# Patient Record
Sex: MALE | Race: WHITE | NOT HISPANIC OR LATINO | Employment: FULL TIME | URBAN - NONMETROPOLITAN AREA
[De-identification: names, ages, dates, MRNs, and addresses within clinical notes are randomized per-mention and may not be internally consistent; named-entity substitution may affect disease eponyms.]

---

## 2019-12-06 ENCOUNTER — OFFICE VISIT (OUTPATIENT)
Dept: INTERNAL MEDICINE | Facility: CLINIC | Age: 54
End: 2019-12-06

## 2019-12-06 VITALS
HEART RATE: 95 BPM | WEIGHT: 198 LBS | DIASTOLIC BLOOD PRESSURE: 88 MMHG | SYSTOLIC BLOOD PRESSURE: 151 MMHG | OXYGEN SATURATION: 98 % | BODY MASS INDEX: 30.01 KG/M2 | HEIGHT: 68 IN | TEMPERATURE: 98.4 F | RESPIRATION RATE: 15 BRPM

## 2019-12-06 DIAGNOSIS — Z79.899 LONG TERM USE OF DRUG: ICD-10-CM

## 2019-12-06 DIAGNOSIS — E66.09 CLASS 1 OBESITY DUE TO EXCESS CALORIES WITH SERIOUS COMORBIDITY AND BODY MASS INDEX (BMI) OF 30.0 TO 30.9 IN ADULT: ICD-10-CM

## 2019-12-06 DIAGNOSIS — I10 ESSENTIAL HYPERTENSION: ICD-10-CM

## 2019-12-06 DIAGNOSIS — M79.2 PERIPHERAL NEUROGENIC PAIN: ICD-10-CM

## 2019-12-06 DIAGNOSIS — N52.9 ERECTILE DYSFUNCTION, UNSPECIFIED ERECTILE DYSFUNCTION TYPE: ICD-10-CM

## 2019-12-06 DIAGNOSIS — E78.2 MIXED HYPERLIPIDEMIA: ICD-10-CM

## 2019-12-06 DIAGNOSIS — E11.65 UNCONTROLLED TYPE 2 DIABETES MELLITUS WITH HYPERGLYCEMIA (HCC): Primary | ICD-10-CM

## 2019-12-06 DIAGNOSIS — E03.9 HYPOTHYROIDISM, UNSPECIFIED TYPE: ICD-10-CM

## 2019-12-06 DIAGNOSIS — G47.9 SLEEPING DIFFICULTIES: ICD-10-CM

## 2019-12-06 PROBLEM — E11.9 DIABETES MELLITUS (HCC): Status: ACTIVE | Noted: 2018-02-01

## 2019-12-06 PROBLEM — E78.5 HYPERLIPIDEMIA: Status: ACTIVE | Noted: 2018-02-01

## 2019-12-06 LAB — HBA1C MFR BLD: 8.8 %

## 2019-12-06 PROCEDURE — 83036 HEMOGLOBIN GLYCOSYLATED A1C: CPT | Performed by: NURSE PRACTITIONER

## 2019-12-06 PROCEDURE — 99202 OFFICE O/P NEW SF 15 MIN: CPT | Performed by: NURSE PRACTITIONER

## 2019-12-06 RX ORDER — ASPIRIN 81 MG/1
TABLET ORAL
COMMUNITY
Start: 2018-02-01

## 2019-12-06 RX ORDER — IBUPROFEN 800 MG/1
TABLET ORAL
COMMUNITY

## 2019-12-06 RX ORDER — LEVOTHYROXINE SODIUM 0.07 MG/1
75 TABLET ORAL DAILY
Refills: 0 | COMMUNITY
Start: 2019-11-18 | End: 2020-10-09 | Stop reason: SDUPTHER

## 2019-12-06 RX ORDER — ATORVASTATIN CALCIUM 80 MG/1
80 TABLET, FILM COATED ORAL
COMMUNITY
End: 2020-10-25

## 2019-12-06 RX ORDER — LISINOPRIL AND HYDROCHLOROTHIAZIDE 25; 20 MG/1; MG/1
TABLET ORAL
COMMUNITY
End: 2020-01-22

## 2019-12-06 RX ORDER — GABAPENTIN 300 MG/1
300 CAPSULE ORAL NIGHTLY PRN
Qty: 30 CAPSULE | Refills: 1 | Status: SHIPPED | OUTPATIENT
Start: 2019-12-06 | End: 2020-02-20

## 2019-12-06 RX ORDER — GLYBURIDE 5 MG/1
5 TABLET ORAL 2 TIMES DAILY
Refills: 0 | COMMUNITY
Start: 2019-11-18 | End: 2020-10-09 | Stop reason: SDUPTHER

## 2020-01-22 ENCOUNTER — OFFICE VISIT (OUTPATIENT)
Dept: INTERNAL MEDICINE | Facility: CLINIC | Age: 55
End: 2020-01-22

## 2020-01-22 VITALS
DIASTOLIC BLOOD PRESSURE: 86 MMHG | SYSTOLIC BLOOD PRESSURE: 154 MMHG | TEMPERATURE: 97.7 F | HEIGHT: 68 IN | OXYGEN SATURATION: 96 % | HEART RATE: 96 BPM | RESPIRATION RATE: 15 BRPM | WEIGHT: 201 LBS | BODY MASS INDEX: 30.46 KG/M2

## 2020-01-22 DIAGNOSIS — L98.9 SKIN LESION OF FACE: ICD-10-CM

## 2020-01-22 DIAGNOSIS — Z13.228 SCREENING FOR ENDOCRINE, NUTRITIONAL, METABOLIC AND IMMUNITY DISORDER: ICD-10-CM

## 2020-01-22 DIAGNOSIS — E66.09 CLASS 1 OBESITY DUE TO EXCESS CALORIES WITH SERIOUS COMORBIDITY AND BODY MASS INDEX (BMI) OF 30.0 TO 30.9 IN ADULT: ICD-10-CM

## 2020-01-22 DIAGNOSIS — E03.9 HYPOTHYROIDISM, UNSPECIFIED TYPE: ICD-10-CM

## 2020-01-22 DIAGNOSIS — E78.2 MIXED HYPERLIPIDEMIA: ICD-10-CM

## 2020-01-22 DIAGNOSIS — Z13.21 SCREENING FOR ENDOCRINE, NUTRITIONAL, METABOLIC AND IMMUNITY DISORDER: ICD-10-CM

## 2020-01-22 DIAGNOSIS — M72.0 DUPUYTREN'S DISEASE OF PALM OF BOTH HANDS: ICD-10-CM

## 2020-01-22 DIAGNOSIS — N52.9 ERECTILE DYSFUNCTION, UNSPECIFIED ERECTILE DYSFUNCTION TYPE: ICD-10-CM

## 2020-01-22 DIAGNOSIS — G62.9 NEUROPATHY: ICD-10-CM

## 2020-01-22 DIAGNOSIS — Z00.00 PHYSICAL EXAM, ANNUAL: Primary | ICD-10-CM

## 2020-01-22 DIAGNOSIS — Z13.0 SCREENING FOR ENDOCRINE, NUTRITIONAL, METABOLIC AND IMMUNITY DISORDER: ICD-10-CM

## 2020-01-22 DIAGNOSIS — E11.65 UNCONTROLLED TYPE 2 DIABETES MELLITUS WITH HYPERGLYCEMIA (HCC): ICD-10-CM

## 2020-01-22 DIAGNOSIS — I10 ESSENTIAL HYPERTENSION: ICD-10-CM

## 2020-01-22 DIAGNOSIS — G89.29 CHRONIC LEFT HIP PAIN: ICD-10-CM

## 2020-01-22 DIAGNOSIS — M25.552 CHRONIC LEFT HIP PAIN: ICD-10-CM

## 2020-01-22 DIAGNOSIS — Z13.29 SCREENING FOR ENDOCRINE, NUTRITIONAL, METABOLIC AND IMMUNITY DISORDER: ICD-10-CM

## 2020-01-22 LAB
POC CREATININE URINE: 200
POC MICROALBUMIN URINE: 10

## 2020-01-22 PROCEDURE — 82044 UR ALBUMIN SEMIQUANTITATIVE: CPT | Performed by: NURSE PRACTITIONER

## 2020-01-22 PROCEDURE — 99396 PREV VISIT EST AGE 40-64: CPT | Performed by: NURSE PRACTITIONER

## 2020-01-22 RX ORDER — IBUPROFEN 800 MG/1
TABLET ORAL
COMMUNITY
End: 2020-01-22

## 2020-01-22 RX ORDER — LISINOPRIL 40 MG/1
40 TABLET ORAL DAILY
Qty: 30 TABLET | Refills: 1 | Status: SHIPPED | OUTPATIENT
Start: 2020-01-22 | End: 2020-01-23

## 2020-01-22 NOTE — PROGRESS NOTES
Subjective   Troy Ruiz is a 54 y.o. male and is here for a comprehensive physical exam. The patient reports problems - left hip pain.  Patient denies any injury or fall but that he normally would sleep on left side but became difficult for him to walk with his walker or sleep on side.  Patient denies having problems breath or hypotension.  Vital signs are stable with exception of elevated blood pressure.  He has not been very active of blood pressure has remained around the same that he was on 40 mg of lisinopril in the past but stopped taking it.  he had no energy at all.  patient states that his blood sugar has been doing better but he did not like it today.  hemoglobin a1c was 8.8 12/6/19.      Do you take any herbs or supplements that were not prescribed by a doctor?no  Are you taking calcium supplements? No  Are you taking aspirin daily? No  Exercise:4 days week and tries to do more but has been having some hip pain  Vision UTD:Yes  Dental UTD:Yes   History:  Patient receives prostate care here: Yes  He reports No decrease in urinary stream,  No nocturia, No dribbling, No hesitancy.    STDs:No  Sexually active at age:17  Abuse:No  Checks testicles:Yes  He is  and has 3 children.  He denies any history of STDs or abuse.  Patient does chew tobacco 1/2 can daily.  He does report some alcohol usage.     The following portions of the patient's history were reviewed and updated as appropriate: allergies, current medications, past family history, past medical history, past social history, past surgical history and problem list.    Review of Systems  Do you have pain that bothers you in your daily life? yes  Review of Systems   Constitutional: Positive for fatigue and unexpected weight gain. Negative for chills, fever and unexpected weight loss.   HENT: Negative for congestion, ear pain, hearing loss, rhinorrhea, sinus pressure, sneezing, sore throat and trouble swallowing.    Eyes: Negative for discharge  "and itching.   Respiratory: Negative for cough, chest tightness and shortness of breath.    Cardiovascular: Negative for chest pain, palpitations and leg swelling.   Gastrointestinal: Negative for abdominal pain, blood in stool, constipation, diarrhea and vomiting.   Endocrine: Negative for polydipsia and polyuria.   Genitourinary: Positive for frequency and erectile dysfunction. Negative for difficulty urinating, dysuria, hematuria, urgency and urinary incontinence.   Musculoskeletal: Positive for arthralgias and myalgias. Negative for back pain, gait problem and joint swelling.        Left hip pain   Skin: Negative for rash and bruise.   Allergic/Immunologic: Negative for immunocompromised state.   Neurological: Positive for numbness. Negative for dizziness, syncope, weakness, light-headedness and headache.   Hematological: Does not bruise/bleed easily.   Psychiatric/Behavioral: Positive for sleep disturbance and stress. Negative for behavioral problems and dysphoric mood. The patient is not nervous/anxious.          Objective   /86   Pulse 96   Temp 97.7 °F (36.5 °C)   Resp 15   Ht 172.7 cm (68\")   Wt 91.2 kg (201 lb)   SpO2 96%   BMI 30.56 kg/m²     Office Visit on 01/22/2020   Component Date Value Ref Range Status   • Microalbumin, Urine 01/22/2020 10   Final   • Creatinine, Urine 01/22/2020 200   Final       Physical Exam   Constitutional: He is oriented to person, place, and time. He appears well-developed and well-nourished. No distress.   HENT:   Head: Normocephalic and atraumatic.   Right Ear: Tympanic membrane, external ear and ear canal normal.   Left Ear: Tympanic membrane, external ear and ear canal normal.   Nose: Nose normal. No sinus tenderness. Rhinorrhea: clear nasal secretions. Right sinus exhibits no maxillary sinus tenderness and no frontal sinus tenderness. Left sinus exhibits no maxillary sinus tenderness and no frontal sinus tenderness.   Mouth/Throat: Oropharynx is clear and " moist.   PND   Eyes: Pupils are equal, round, and reactive to light. Conjunctivae and EOM are normal.   Neck: Normal range of motion. Neck supple.   Cardiovascular: Normal rate, regular rhythm, normal heart sounds and intact distal pulses.   Pulmonary/Chest: Effort normal and breath sounds normal. No respiratory distress.   Abdominal: Soft. Bowel sounds are normal. He exhibits no distension. There is no tenderness. There is no guarding.   Genitourinary:   Genitourinary Comments: deferred   Musculoskeletal: He exhibits tenderness and deformity.        Right hand: He exhibits decreased range of motion, bony tenderness and deformity.        Left hand: He exhibits decreased range of motion, bony tenderness and deformity.        Hands:   Lumps noted in bilateral palms and firmness with ridges and decrease mobility       During the foot exam he had a monofilament test performed.  Lymphadenopathy:        Head (right side): No submental, no submandibular and no tonsillar adenopathy present.        Head (left side): No submental, no submandibular and no tonsillar adenopathy present.     He has no cervical adenopathy.   Neurological: He is alert and oriented to person, place, and time. Coordination normal.   Skin: Skin is warm and dry. Capillary refill takes less than 2 seconds. No rash noted.   Psychiatric: He has a normal mood and affect. His behavior is normal. Judgment and thought content normal.   Nursing note and vitals reviewed.        Assessment/Plan   Healthy male exam.      1.Troy was seen today for annual exam.    Diagnoses and all orders for this visit:    Physical exam, annual  -     Comprehensive metabolic panel  -     Lipid panel  -     CBC w AUTO Differential    Uncontrolled type 2 diabetes mellitus with hyperglycemia (CMS/[facility])  -     Ambulatory Referral to Podiatry  -     Ambulatory Referral to Ophthalmology  -     POCT microalbumin  Follow diabetic diet  Monitor blood sugars as discussed  See eye doctor  annually or as discussed  Wear protective foot wear/no bare feet  Check feet regularly for calluses or ulcers  Discussed risk of poorly controlled diabetes and long-term complications  Exercise as tolerated up to 30 minutes 5 days a week  Take all medications as prescribed    Neuropathy  -     Ambulatory Referral to Podiatry    Chronic left hip pain  -     XR hip w or wo pelvis 2-3 view left  Discussed nonpharmacological interventions with patient.  Skin lesion of face  -     Ambulatory Referral to Dermatology  Advised patient to use sunblock and check skin monthly  Hypothyroidism, unspecified type  -     TSH  -     T4, free    Mixed hyperlipidemia  Check l;ipids    Essential hypertension  -     lisinopril (PRINIVIL,ZESTRIL) 40 MG tablet; Take 1 tablet by mouth Daily.  Initiate lifestyle modifications.   DASH Diet and exercise.   Compliance with medication regimen and discussed ways to prevent of long-term complications from high blood pressure.  Discussed when to seek medical attention.  Encouraged patient to take blood pressure daily and keep a log.      Class 1 obesity due to excess calories with serious comorbidity and body mass index (BMI) of 30.0 to 30.9 in adult  -     Vitamin D 25 hydroxy  Patient's Body mass index is 30.56 kg/m². BMI is above normal parameters. Recommendations include: exercise counseling and nutrition counseling.    Erectile dysfunction, unspecified erectile dysfunction type  -     Testosterone (Free & Total), LC / MS    Screening for endocrine, nutritional, metabolic and immunity disorder  -     Vitamin B12  -     Vitamin D 25 hydroxy    Dupuytren's disease of palm of both hands  -     Ambulatory Referral to Hand Surgery        2. Patient Counseling:  --Nutrition: Stressed importance of moderation in sodium/caffeine intake, saturated fat and cholesterol, caloric balance, sufficient intake of fresh fruits, vegetables, fiber, calcium, iron,   --Discussed the issue of calcium supplement, and  the daily use of baby aspirin if applicable.  --Exercise: Stressed the importance of regular exercise.   --Substance Abuse: Discussed cessation/primary prevention of tobacco (if applicable, alcohol, or other drug use (if applicable); driving or other dangerous activities under the influence; availability of treatment for abuse.    --Sexuality: Discussed sexually transmitted diseases, partner selection, use of condoms, avoidance of unintended pregnancy  and contraceptive alternatives.   --Injury prevention: Discussed safety belts, safety helmets, smoke detector, smoking near bedding or upholstery.   --Dental health: Discussed importance of regular tooth brushing, flossing, and dental visits.  --Immunizations reviewed.  --Discussed benefits of screening colonoscopy (if applicable).  --After hours service discussed with patient  Patient currently refuses colonoscopy and states he will contact insurance about Cologuard  3. Discussed the patient's BMI with him.  The BMI is above average; BMI management plan is completed  4. Follow up in 4 weeks and prton Boo, APRN 01/22/2020

## 2020-01-22 NOTE — PATIENT INSTRUCTIONS
Health Maintenance, Male  A healthy lifestyle and preventive care is important for your health and wellness. Ask your health care provider about what schedule of regular examinations is right for you.  What should I know about weight and diet?  Eat a Healthy Diet  · Eat plenty of vegetables, fruits, whole grains, low-fat dairy products, and lean protein.  · Do not eat a lot of foods high in solid fats, added sugars, or salt.    Maintain a Healthy Weight  Regular exercise can help you achieve or maintain a healthy weight. You should:  · Do at least 150 minutes of exercise each week. The exercise should increase your heart rate and make you sweat (moderate-intensity exercise).  · Do strength-training exercises at least twice a week.  Watch Your Levels of Cholesterol and Blood Lipids  · Have your blood tested for lipids and cholesterol every 5 years starting at 35 years of age. If you are at high risk for heart disease, you should start having your blood tested when you are 20 years old. You may need to have your cholesterol levels checked more often if:  ? Your lipid or cholesterol levels are high.  ? You are older than 50 years of age.  ? You are at high risk for heart disease.  What should I know about cancer screening?  Many types of cancers can be detected early and may often be prevented.  Lung Cancer  · You should be screened every year for lung cancer if:  ? You are a current smoker who has smoked for at least 30 years.  ? You are a former smoker who has quit within the past 15 years.  · Talk to your health care provider about your screening options, when you should start screening, and how often you should be screened.  Colorectal Cancer  · Routine colorectal cancer screening usually begins at 50 years of age and should be repeated every 5-10 years until you are 75 years old. You may need to be screened more often if early forms of precancerous polyps or small growths are found. Your health care provider may  recommend screening at an earlier age if you have risk factors for colon cancer.  · Your health care provider may recommend using home test kits to check for hidden blood in the stool.  · A small camera at the end of a tube can be used to examine your colon (sigmoidoscopy or colonoscopy). This checks for the earliest forms of colorectal cancer.  Prostate and Testicular Cancer  · Depending on your age and overall health, your health care provider may do certain tests to screen for prostate and testicular cancer.  · Talk to your health care provider about any symptoms or concerns you have about testicular or prostate cancer.  Skin Cancer  · Check your skin from head to toe regularly.  · Tell your health care provider about any new moles or changes in moles, especially if:  ? There is a change in a mole’s size, shape, or color.  ? You have a mole that is larger than a pencil eraser.  · Always use sunscreen. Apply sunscreen liberally and repeat throughout the day.  · Protect yourself by wearing long sleeves, pants, a wide-brimmed hat, and sunglasses when outside.  What should I know about heart disease, diabetes, and high blood pressure?  · If you are 18-39 years of age, have your blood pressure checked every 3-5 years. If you are 40 years of age or older, have your blood pressure checked every year. You should have your blood pressure measured twice--once when you are at a hospital or clinic, and once when you are not at a hospital or clinic. Record the average of the two measurements. To check your blood pressure when you are not at a hospital or clinic, you can use:  ? An automated blood pressure machine at a pharmacy.  ? A home blood pressure monitor.  · Talk to your health care provider about your target blood pressure.  · If you are between 45-79 years old, ask your health care provider if you should take aspirin to prevent heart disease.  · Have regular diabetes screenings by checking your fasting blood sugar  level.  ? If you are at a normal weight and have a low risk for diabetes, have this test once every three years after the age of 45.  ? If you are overweight and have a high risk for diabetes, consider being tested at a younger age or more often.  · A one-time screening for abdominal aortic aneurysm (AAA) by ultrasound is recommended for men aged 65-75 years who are current or former smokers.  What should I know about preventing infection?  Hepatitis B  If you have a higher risk for hepatitis B, you should be screened for this virus. Talk with your health care provider to find out if you are at risk for hepatitis B infection.  Hepatitis C  Blood testing is recommended for:  · Everyone born from 1945 through 1965.  · Anyone with known risk factors for hepatitis C.  Sexually Transmitted Diseases (STDs)  · You should be screened each year for STDs including gonorrhea and chlamydia if:  ? You are sexually active and are younger than 24 years of age.  ? You are older than 24 years of age and your health care provider tells you that you are at risk for this type of infection.  ? Your sexual activity has changed since you were last screened and you are at an increased risk for chlamydia or gonorrhea. Ask your health care provider if you are at risk.  · Talk with your health care provider about whether you are at high risk of being infected with HIV. Your health care provider may recommend a prescription medicine to help prevent HIV infection.  What else can I do?  · Schedule regular health, dental, and eye exams.  · Stay current with your vaccines (immunizations).  · Do not use any tobacco products, such as cigarettes, chewing tobacco, and e-cigarettes. If you need help quitting, ask your health care provider.  · Limit alcohol intake to no more than 2 drinks per day. One drink equals 12 ounces of beer, 5 ounces of wine, or 1½ ounces of hard liquor.  · Do not use street drugs.  · Do not share needles.  · Ask your health  care provider for help if you need support or information about quitting drugs.  · Tell your health care provider if you often feel depressed.  · Tell your health care provider if you have ever been abused or do not feel safe at home.  This information is not intended to replace advice given to you by your health care provider. Make sure you discuss any questions you have with your health care provider.  Document Released: 06/15/2009 Document Revised: 08/16/2017 Document Reviewed: 09/20/2016  [employer] Patient Education © 2019 [employer]

## 2020-01-23 RX ORDER — LISINOPRIL 40 MG/1
40 TABLET ORAL DAILY
Qty: 90 TABLET | Refills: 1 | Status: SHIPPED | OUTPATIENT
Start: 2020-01-23 | End: 2020-10-09 | Stop reason: SDUPTHER

## 2020-02-20 DIAGNOSIS — M79.2 PERIPHERAL NEUROGENIC PAIN: ICD-10-CM

## 2020-02-21 RX ORDER — GABAPENTIN 300 MG/1
CAPSULE ORAL
Qty: 30 CAPSULE | Refills: 0 | Status: SHIPPED | OUTPATIENT
Start: 2020-02-21 | End: 2020-10-09

## 2020-03-11 ENCOUNTER — TELEPHONE (OUTPATIENT)
Dept: INTERNAL MEDICINE | Facility: CLINIC | Age: 55
End: 2020-03-11

## 2020-03-11 NOTE — TELEPHONE ENCOUNTER
PATIENT STATED THAT SOMEONE FROM THE OFFICE CALLED HIM AND HE IS AT WORK AND COULDN'T HEAR THEM. HE DOESN'T HAVE A NAME OF THE PERSON.    PATIENT CALL BACK [phone]

## 2020-10-09 ENCOUNTER — RESULTS ENCOUNTER (OUTPATIENT)
Dept: INTERNAL MEDICINE | Facility: CLINIC | Age: 55
End: 2020-10-09

## 2020-10-09 ENCOUNTER — OFFICE VISIT (OUTPATIENT)
Dept: INTERNAL MEDICINE | Facility: CLINIC | Age: 55
End: 2020-10-09

## 2020-10-09 VITALS
SYSTOLIC BLOOD PRESSURE: 173 MMHG | HEIGHT: 68 IN | BODY MASS INDEX: 29.86 KG/M2 | HEART RATE: 88 BPM | RESPIRATION RATE: 15 BRPM | TEMPERATURE: 98.6 F | WEIGHT: 197 LBS | DIASTOLIC BLOOD PRESSURE: 93 MMHG | OXYGEN SATURATION: 99 %

## 2020-10-09 DIAGNOSIS — Z12.11 SCREEN FOR COLON CANCER: ICD-10-CM

## 2020-10-09 DIAGNOSIS — I10 ESSENTIAL HYPERTENSION: ICD-10-CM

## 2020-10-09 DIAGNOSIS — E11.65 UNCONTROLLED TYPE 2 DIABETES MELLITUS WITH HYPERGLYCEMIA (HCC): Primary | ICD-10-CM

## 2020-10-09 DIAGNOSIS — Z23 NEED FOR INFLUENZA VACCINATION: ICD-10-CM

## 2020-10-09 DIAGNOSIS — E03.9 HYPOTHYROIDISM, UNSPECIFIED TYPE: ICD-10-CM

## 2020-10-09 DIAGNOSIS — E78.2 MIXED HYPERLIPIDEMIA: ICD-10-CM

## 2020-10-09 DIAGNOSIS — M79.2 PERIPHERAL NEUROGENIC PAIN: ICD-10-CM

## 2020-10-09 PROCEDURE — 90471 IMMUNIZATION ADMIN: CPT | Performed by: NURSE PRACTITIONER

## 2020-10-09 PROCEDURE — 99214 OFFICE O/P EST MOD 30 MIN: CPT | Performed by: NURSE PRACTITIONER

## 2020-10-09 PROCEDURE — 90686 IIV4 VACC NO PRSV 0.5 ML IM: CPT | Performed by: NURSE PRACTITIONER

## 2020-10-09 RX ORDER — LEVOTHYROXINE SODIUM 0.07 MG/1
75 TABLET ORAL DAILY
Qty: 90 TABLET | Refills: 1 | Status: SHIPPED | OUTPATIENT
Start: 2020-10-09

## 2020-10-09 RX ORDER — LISINOPRIL 40 MG/1
40 TABLET ORAL DAILY
Qty: 90 TABLET | Refills: 1 | Status: SHIPPED | OUTPATIENT
Start: 2020-10-09

## 2020-10-09 RX ORDER — GLYBURIDE 5 MG/1
5 TABLET ORAL 2 TIMES DAILY
Qty: 180 TABLET | Refills: 1 | Status: SHIPPED | OUTPATIENT
Start: 2020-10-09

## 2020-10-15 LAB
25(OH)D3+25(OH)D2 SERPL-MCNC: 49 NG/ML (ref 30–100)
ALBUMIN SERPL-MCNC: 5 G/DL (ref 3.5–5.2)
ALBUMIN/GLOB SERPL: 2.8 G/DL
ALP SERPL-CCNC: 81 U/L (ref 39–117)
ALT SERPL-CCNC: 30 U/L (ref 1–41)
AST SERPL-CCNC: 22 U/L (ref 1–40)
BASOPHILS # BLD AUTO: 0.17 10*3/MM3 (ref 0–0.2)
BASOPHILS NFR BLD AUTO: 1.7 % (ref 0–1.5)
BILIRUB SERPL-MCNC: 0.4 MG/DL (ref 0–1.2)
BUN SERPL-MCNC: 14 MG/DL (ref 6–20)
BUN/CREAT SERPL: 16.9 (ref 7–25)
CALCIUM SERPL-MCNC: 10.2 MG/DL (ref 8.6–10.5)
CHLORIDE SERPL-SCNC: 98 MMOL/L (ref 98–107)
CHOLEST SERPL-MCNC: 240 MG/DL (ref 0–200)
CO2 SERPL-SCNC: 25.7 MMOL/L (ref 22–29)
CREAT SERPL-MCNC: 0.83 MG/DL (ref 0.76–1.27)
EOSINOPHIL # BLD AUTO: 0.16 10*3/MM3 (ref 0–0.4)
EOSINOPHIL NFR BLD AUTO: 1.6 % (ref 0.3–6.2)
ERYTHROCYTE [DISTWIDTH] IN BLOOD BY AUTOMATED COUNT: 12.2 % (ref 12.3–15.4)
GLOBULIN SER CALC-MCNC: 1.8 GM/DL
GLUCOSE SERPL-MCNC: 249 MG/DL (ref 65–99)
HCT VFR BLD AUTO: 49 % (ref 37.5–51)
HDLC SERPL-MCNC: 42 MG/DL (ref 40–60)
HGB BLD-MCNC: 16.7 G/DL (ref 13–17.7)
IMM GRANULOCYTES # BLD AUTO: 0.03 10*3/MM3 (ref 0–0.05)
IMM GRANULOCYTES NFR BLD AUTO: 0.3 % (ref 0–0.5)
LDLC SERPL CALC-MCNC: 119 MG/DL (ref 0–100)
LYMPHOCYTES # BLD AUTO: 3.06 10*3/MM3 (ref 0.7–3.1)
LYMPHOCYTES NFR BLD AUTO: 29.9 % (ref 19.6–45.3)
MCH RBC QN AUTO: 31 PG (ref 26.6–33)
MCHC RBC AUTO-ENTMCNC: 34.1 G/DL (ref 31.5–35.7)
MCV RBC AUTO: 91.1 FL (ref 79–97)
MONOCYTES # BLD AUTO: 0.77 10*3/MM3 (ref 0.1–0.9)
MONOCYTES NFR BLD AUTO: 7.5 % (ref 5–12)
NEUTROPHILS # BLD AUTO: 6.06 10*3/MM3 (ref 1.7–7)
NEUTROPHILS NFR BLD AUTO: 59 % (ref 42.7–76)
NRBC BLD AUTO-RTO: 0 /100 WBC (ref 0–0.2)
PLATELET # BLD AUTO: 266 10*3/MM3 (ref 140–450)
POTASSIUM SERPL-SCNC: 4.8 MMOL/L (ref 3.5–5.2)
PROT SERPL-MCNC: 6.8 G/DL (ref 6–8.5)
RBC # BLD AUTO: 5.38 10*6/MM3 (ref 4.14–5.8)
SODIUM SERPL-SCNC: 137 MMOL/L (ref 136–145)
T4 FREE SERPL-MCNC: 1.24 NG/DL (ref 0.93–1.7)
TESTOST FREE SERPL-MCNC: 9.4 PG/ML (ref 7.2–24)
TESTOST SERPL-MCNC: 295.2 NG/DL (ref 264–916)
TRIGL SERPL-MCNC: 450 MG/DL (ref 0–150)
TSH SERPL DL<=0.005 MIU/L-ACNC: 2.59 UIU/ML (ref 0.27–4.2)
VIT B12 SERPL-MCNC: 1097 PG/ML (ref 211–946)
VLDLC SERPL CALC-MCNC: 79 MG/DL (ref 5–40)
WBC # BLD AUTO: 10.25 10*3/MM3 (ref 3.4–10.8)

## 2020-10-25 RX ORDER — ATORVASTATIN CALCIUM 20 MG/1
20 TABLET, FILM COATED ORAL DAILY
Qty: 90 TABLET | Refills: 1 | Status: SHIPPED | OUTPATIENT
Start: 2020-10-25 | End: 2021-10-13

## 2020-11-30 ENCOUNTER — TELEPHONE (OUTPATIENT)
Dept: INTERNAL MEDICINE | Facility: CLINIC | Age: 55
End: 2020-11-30

## 2020-11-30 NOTE — TELEPHONE ENCOUNTER
Contacted patient and he stated wife developed symptoms Saturday and tested positive for COVID today. I urged patient to follow CDC's recommendations and self quarantine for the next 14 days and to be tested at [facility] if symptoms develop in the next 2 weeks. Patient states he needs a test for work and I advised him to contact [facility] to see what the best course of action would be since wife is the positive exposure.

## 2020-11-30 NOTE — TELEPHONE ENCOUNTER
Caller: Troy Ruiz    Relationship to patient: Self    Best call back number:[phone]    Concerns or Questions if Applicable: EXPOSED TO COVID     Travel screen questions: NO SYMPTOMS  WHEN CAN HE HAVE THE SCREENING        ”

## 2021-01-27 ENCOUNTER — TELEPHONE (OUTPATIENT)
Dept: INTERNAL MEDICINE | Facility: CLINIC | Age: 56
End: 2021-01-27

## 2021-01-27 NOTE — TELEPHONE ENCOUNTER
Patient did not complete hip xray ordered on 01/22/2020. This order is too old to be used. May I cancel the order?

## 2021-10-13 ENCOUNTER — OFFICE VISIT (OUTPATIENT)
Dept: GASTROENTEROLOGY | Facility: CLINIC | Age: 56
End: 2021-10-13

## 2021-10-13 VITALS
DIASTOLIC BLOOD PRESSURE: 93 MMHG | WEIGHT: 197 LBS | TEMPERATURE: 98.2 F | BODY MASS INDEX: 29.86 KG/M2 | HEIGHT: 68 IN | HEART RATE: 91 BPM | SYSTOLIC BLOOD PRESSURE: 165 MMHG

## 2021-10-13 DIAGNOSIS — Z12.11 ENCOUNTER FOR SCREENING FOR MALIGNANT NEOPLASM OF COLON: Primary | ICD-10-CM

## 2021-10-13 PROCEDURE — S0285 CNSLT BEFORE SCREEN COLONOSC: HCPCS | Performed by: NURSE PRACTITIONER

## 2021-10-13 RX ORDER — SODIUM CHLORIDE 9 MG/ML
70 INJECTION, SOLUTION INTRAVENOUS CONTINUOUS PRN
Status: CANCELLED | OUTPATIENT
Start: 2021-10-13

## 2021-10-13 RX ORDER — MULTIPLE VITAMINS W/ MINERALS TAB 9MG-400MCG
1 TAB ORAL DAILY
COMMUNITY

## 2021-10-13 NOTE — PROGRESS NOTES
New Patient Consult      Date: 10/13/2021   Patient Name: Troy Ruiz  MRN: [id number]  : 1965     Primary Care Provider: Ruth Ramos APRN    Chief Complaint   Patient presents with   • Colon Cancer Screening     History of Present Illness: Troy Ruiz is a 56 y.o. male who is here today to establish care with Gastroenterology for colon cancer screening.     The patient denies recent change in bowel habits. There is no diarrhea or constipation. There is no history of abdominal pain. There is no history of overt GI bleed (hematemesis melena or hematochezia). The patient denies nausea or vomiting. There is no history of reflux. The patient denies dysphagia or odynophagia. There is no history of recent significant weight loss. There is no history of liver disease in the past. There is no family history of GI malignancy. The patient has not had a colonoscopy in the past.    Subjective      Past Medical History:   Diagnosis Date   • Diabetes mellitus (HCC)    • Hyperlipidemia    • Hypertension    • Hypothyroidism    • Tattoos      Past Surgical History:   Procedure Laterality Date   • BELPHAROPTOSIS REPAIR      droopy eyelids   • MYRINGOTOMY W/ TUBES     • WISDOM TOOTH EXTRACTION       Family History   Problem Relation Age of Onset   • Other Mother    • Heart disease Mother    • Diabetes Mother    • Colon cancer Neg Hx    • Cirrhosis Neg Hx    • Liver cancer Neg Hx    • Liver disease Neg Hx      Social History     Socioeconomic History   • Marital status:    Tobacco Use   • Smoking status: Former Smoker     Packs/day: 0.00     Years: 0.00     Pack years: 0.00     Start date:      Quit date:      Years since quittin.7   • Smokeless tobacco: Current User   Vaping Use   • Vaping Use: Never used   Substance and Sexual Activity   • Alcohol use: Yes     Alcohol/week: 8.0 - 12.0 standard drinks     Types: 8 - 12 Cans of beer per week     Comment: weekly   • Drug use: Never   • Sexual  activity: Defer       Current Outpatient Medications:   •  aspirin 81 MG EC tablet, Low Dose Aspirin 81 mg tablet,delayed release  Take 1 tablet every day by oral route., Disp: , Rfl:   •  glyburide (DIAbeta) 5 MG tablet, Take 1 tablet by mouth 2 (Two) Times a Day., Disp: 180 tablet, Rfl: 1  •  ibuprofen (ADVIL,MOTRIN) 800 MG tablet, ibuprofen 800 mg tablet, Disp: , Rfl:   •  levothyroxine (SYNTHROID, LEVOTHROID) 75 MCG tablet, Take 1 tablet by mouth Daily., Disp: 90 tablet, Rfl: 1  •  lisinopril (PRINIVIL,ZESTRIL) 40 MG tablet, Take 1 tablet by mouth Daily., Disp: 90 tablet, Rfl: 1  •  metFORMIN (GLUCOPHAGE) 500 MG tablet, Take 2 tablets by mouth 2 (Two) Times a Day With Meals., Disp: 360 tablet, Rfl: 1  •  multivitamin with minerals (MENS MULTIVITAMIN PO), Take 1 tablet by mouth Daily., Disp: , Rfl:   •  NON FORMULARY, Cortisone injections, Disp: , Rfl:   •  Omega-3 Fatty Acids (FISH OIL PO), Take  by mouth Daily., Disp: , Rfl:     Allergies   Allergen Reactions   • Pneumococcal Vaccine Hives   • Statins Myalgia     Leg cramps       The following portions of the patient's history were reviewed and updated as appropriate: allergies, current medications, past family history, past medical history, past social history, past surgical history and problem list.    Objective     Physical Exam  Vitals and nursing note reviewed.   Constitutional:       General: He is not in acute distress.     Appearance: Normal appearance. He is well-developed.   HENT:      Head: Normocephalic and atraumatic.      Right Ear: Hearing normal.      Left Ear: Hearing normal.      Mouth/Throat:      Mouth: Mucous membranes are not pale, not dry and not cyanotic.   Eyes:      General: Lids are normal.      Conjunctiva/sclera: Conjunctivae normal.   Neck:      Trachea: Trachea normal.   Cardiovascular:      Rate and Rhythm: Normal rate and regular rhythm.      Heart sounds: Normal heart sounds.   Pulmonary:      Effort: Pulmonary effort is  "normal. No respiratory distress.      Breath sounds: Normal breath sounds.   Abdominal:      General: Bowel sounds are normal.      Palpations: Abdomen is soft. There is no mass.      Tenderness: There is no abdominal tenderness.      Hernia: No hernia is present.   Skin:     General: Skin is warm and dry.   Neurological:      Mental Status: He is alert and oriented to person, place, and time.   Psychiatric:         Mood and Affect: Mood normal.         Speech: Speech normal.         Behavior: Behavior normal. Behavior is cooperative.       Vitals:    10/13/21 1313   BP: 165/93   Pulse: 91   Temp: 98.2 °F (36.8 °C)   TempSrc: Infrared   Weight: 89.4 kg (197 lb)   Height: 172.7 cm (68\")     Body mass index is 29.95 kg/m².     Results Review:   I have reviewed the patient's new clinical and imaging results.    No visits with results within 90 Day(s) from this visit.   Latest known visit with results is:   Office Visit on 01/22/2020   Component Date Value Ref Range Status   • Microalbumin, Urine 01/22/2020 10   Final   • Creatinine, Urine 01/22/2020 200   Final   • TSH 10/09/2020 2.590  0.270 - 4.200 uIU/mL Final   • Free T4 10/09/2020 1.24  0.93 - 1.70 ng/dL Final    Results may be falsely increased if patient taking Biotin.   • Glucose 10/09/2020 249* 65 - 99 mg/dL Final   • BUN 10/09/2020 14  6 - 20 mg/dL Final   • Creatinine 10/09/2020 0.83  0.76 - 1.27 mg/dL Final   • eGFR Non African Am 10/09/2020 97  >60 mL/min/1.73 Final   • eGFR African Am 10/09/2020 117  >60 mL/min/1.73 Final   • BUN/Creatinine Ratio 10/09/2020 16.9  7.0 - 25.0 Final   • Sodium 10/09/2020 137  136 - 145 mmol/L Final   • Potassium 10/09/2020 4.8  3.5 - 5.2 mmol/L Final   • Chloride 10/09/2020 98  98 - 107 mmol/L Final   • Total CO2 10/09/2020 25.7  22.0 - 29.0 mmol/L Final   • Calcium 10/09/2020 10.2  8.6 - 10.5 mg/dL Final   • Total Protein 10/09/2020 6.8  6.0 - 8.5 g/dL Final   • Albumin 10/09/2020 5.00  3.50 - 5.20 g/dL Final   • Globulin " 10/09/2020 1.8  gm/dL Final   • A/G Ratio 10/09/2020 2.8  g/dL Final   • Total Bilirubin 10/09/2020 0.4  0.0 - 1.2 mg/dL Final   • Alkaline Phosphatase 10/09/2020 81  39 - 117 U/L Final   • AST (SGOT) 10/09/2020 22  1 - 40 U/L Final   • ALT (SGPT) 10/09/2020 30  1 - 41 U/L Final   • Total Cholesterol 10/09/2020 240* 0 - 200 mg/dL Final   • Triglycerides 10/09/2020 450* 0 - 150 mg/dL Final   • HDL Cholesterol 10/09/2020 42  40 - 60 mg/dL Final   • VLDL Cholesterol Anastacio 10/09/2020 79* 5 - 40 mg/dL Final   • LDL Chol Calc (NIH) 10/09/2020 119* 0 - 100 mg/dL Final   • Vitamin B-12 10/09/2020 1,097* 211 - 946 pg/mL Final    Results may be falsely increased if patient taking Biotin.   • 25 Hydroxy, Vitamin D 10/09/2020 49.0  30.0 - 100.0 ng/ml Final    Comment: Results may be falsely increased if patient taking Biotin.  Reference Range for Total Vitamin D 25(OH)  Deficiency <20.0 ng/mL  Insufficiency 21-29 ng/mL  Sufficiency  ng/mL  Toxicity >100 ng/ml     • WBC 10/09/2020 10.25  3.40 - 10.80 10*3/mm3 Final   • RBC 10/09/2020 5.38  4.14 - 5.80 10*6/mm3 Final   • Hemoglobin 10/09/2020 16.7  13.0 - 17.7 g/dL Final   • Hematocrit 10/09/2020 49.0  37.5 - 51.0 % Final   • MCV 10/09/2020 91.1  79.0 - 97.0 fL Final   • MCH 10/09/2020 31.0  26.6 - 33.0 pg Final   • MCHC 10/09/2020 34.1  31.5 - 35.7 g/dL Final   • RDW 10/09/2020 12.2* 12.3 - 15.4 % Final   • Platelets 10/09/2020 266  140 - 450 10*3/mm3 Final   • Neutrophil Rel % 10/09/2020 59.0  42.7 - 76.0 % Final   • Lymphocyte Rel % 10/09/2020 29.9  19.6 - 45.3 % Final   • Monocyte Rel % 10/09/2020 7.5  5.0 - 12.0 % Final   • Eosinophil Rel % 10/09/2020 1.6  0.3 - 6.2 % Final   • Basophil Rel % 10/09/2020 1.7* 0.0 - 1.5 % Final   • Neutrophils Absolute 10/09/2020 6.06  1.70 - 7.00 10*3/mm3 Final   • Lymphocytes Absolute 10/09/2020 3.06  0.70 - 3.10 10*3/mm3 Final   • Monocytes Absolute 10/09/2020 0.77  0.10 - 0.90 10*3/mm3 Final   • Eosinophils Absolute 10/09/2020 0.16   0.00 - 0.40 10*3/mm3 Final   • Basophils Absolute 10/09/2020 0.17  0.00 - 0.20 10*3/mm3 Final   • Immature Granulocyte Rel % 10/09/2020 0.3  0.0 - 0.5 % Final   • Immature Grans Absolute 10/09/2020 0.03  0.00 - 0.05 10*3/mm3 Final   • nRBC 10/09/2020 0.0  0.0 - 0.2 /100 WBC Final   • Testosterone, Total 10/09/2020 295.2  264.0 - 916.0 ng/dL Final    Comment: This [facility] LC/MS-MS method is currently certified by the CDC  Hormone Standardization Program (HoSt). Adult male reference  interval is based on a population of healthy nonobese males  (BMI <30) between 19 and 39 years old. Charito et.al. JCEM  2017,102;5604-3832. PMID: 11310001.     • Testosterone, Free 10/09/2020 9.4  7.2 - 24.0 pg/mL Final      No radiology results for the last 90 days.     Assessment / Plan      1. Encounter for screening for malignant neoplasm of colon  He has not had a colonoscopy in the past.  There is no family history of colon cancer.  Colonoscopy for colon cancer screening    - Case Request; Standing  - Case Request    Patient Instructions   Colonoscopy: The indications, technique, alternatives and potential risk and complications were discussed with the patient including but not limited to bleeding, perforations, missing lesions and anesthetic complications. The patient understands and wishes to proceed with the procedure and has given their verbal consent. Written patient education information was given to the patient.   The patient will call if they have further questions before procedure.      Ratna Richardson, APRN  10/13/2021    Please note that portions of this note may have been completed with a voice recognition program. Efforts were made to edit the dictations, but occasionally words are mistranscribed.

## 2021-11-29 ENCOUNTER — TELEPHONE (OUTPATIENT)
Dept: GASTROENTEROLOGY | Facility: CLINIC | Age: 56
End: 2021-11-29

## 2021-11-29 DIAGNOSIS — Z12.11 ENCOUNTER FOR SCREENING FOR MALIGNANT NEOPLASM OF COLON: Primary | ICD-10-CM

## 2021-11-29 RX ORDER — POLYETHYLENE GLYCOL 3350, SODIUM SULFATE, SODIUM CHLORIDE, POTASSIUM CHLORIDE, ASCORBIC ACID, SODIUM ASCORBATE 140-9-5.2G
1 KIT ORAL TAKE AS DIRECTED
Qty: 1 EACH | Refills: 0 | Status: SHIPPED | OUTPATIENT
Start: 2021-11-29

## 2022-01-25 ENCOUNTER — TELEPHONE (OUTPATIENT)
Dept: GASTROENTEROLOGY | Facility: CLINIC | Age: 57
End: 2022-01-25

## 2022-01-25 NOTE — TELEPHONE ENCOUNTER
Spoke to patient that elective proc have been cancelled due to increase in covid , office will call to rs at a later date

## 2022-03-17 ENCOUNTER — TELEPHONE (OUTPATIENT)
Dept: GASTROENTEROLOGY | Facility: CLINIC | Age: 57
End: 2022-03-17

## 2022-03-17 NOTE — TELEPHONE ENCOUNTER
Called patient to reschedule colonoscopy. He stated that he doesn't have his schedule right now and will call back when he does

## 2023-04-12 ENCOUNTER — LAB (OUTPATIENT)
Dept: LAB | Facility: HOSPITAL | Age: 58
End: 2023-04-12
Payer: COMMERCIAL

## 2023-04-12 ENCOUNTER — TRANSCRIBE ORDERS (OUTPATIENT)
Dept: LAB | Facility: HOSPITAL | Age: 58
End: 2023-04-12
Payer: COMMERCIAL

## 2023-04-12 DIAGNOSIS — E03.9 HYPOTHYROIDISM, ADULT: ICD-10-CM

## 2023-04-12 DIAGNOSIS — E78.5 HYPERLIPIDEMIA, UNSPECIFIED HYPERLIPIDEMIA TYPE: ICD-10-CM

## 2023-04-12 DIAGNOSIS — E03.9 HYPOTHYROIDISM, ADULT: Primary | ICD-10-CM

## 2023-04-12 LAB
ALBUMIN SERPL-MCNC: 4.9 G/DL (ref 3.5–5.2)
ALBUMIN/GLOB SERPL: 2.3 G/DL
ALP SERPL-CCNC: 69 U/L (ref 39–117)
ALT SERPL W P-5'-P-CCNC: 33 U/L (ref 1–41)
ANION GAP SERPL CALCULATED.3IONS-SCNC: 15.9 MMOL/L (ref 5–15)
AST SERPL-CCNC: 31 U/L (ref 1–40)
BASOPHILS # BLD AUTO: 0.14 10*3/MM3 (ref 0–0.2)
BASOPHILS NFR BLD AUTO: 1.3 % (ref 0–1.5)
BILIRUB SERPL-MCNC: 0.5 MG/DL (ref 0–1.2)
BUN SERPL-MCNC: 11 MG/DL (ref 6–20)
BUN/CREAT SERPL: 12.8 (ref 7–25)
CALCIUM SPEC-SCNC: 10.7 MG/DL (ref 8.6–10.5)
CHLORIDE SERPL-SCNC: 97 MMOL/L (ref 98–107)
CHOLEST SERPL-MCNC: 193 MG/DL (ref 0–200)
CO2 SERPL-SCNC: 23.1 MMOL/L (ref 22–29)
CREAT SERPL-MCNC: 0.86 MG/DL (ref 0.76–1.27)
DEPRECATED RDW RBC AUTO: 38.5 FL (ref 37–54)
EGFRCR SERPLBLD CKD-EPI 2021: 101.6 ML/MIN/1.73
EOSINOPHIL # BLD AUTO: 0.36 10*3/MM3 (ref 0–0.4)
EOSINOPHIL NFR BLD AUTO: 3.4 % (ref 0.3–6.2)
ERYTHROCYTE [DISTWIDTH] IN BLOOD BY AUTOMATED COUNT: 12.2 % (ref 12.3–15.4)
GLOBULIN UR ELPH-MCNC: 2.1 GM/DL
GLUCOSE SERPL-MCNC: 167 MG/DL (ref 65–99)
HCT VFR BLD AUTO: 47.2 % (ref 37.5–51)
HDLC SERPL-MCNC: 40 MG/DL (ref 40–60)
HGB BLD-MCNC: 16.6 G/DL (ref 13–17.7)
IMM GRANULOCYTES # BLD AUTO: 0.02 10*3/MM3 (ref 0–0.05)
IMM GRANULOCYTES NFR BLD AUTO: 0.2 % (ref 0–0.5)
LDLC SERPL CALC-MCNC: 98 MG/DL (ref 0–100)
LDLC/HDLC SERPL: 2.2 {RATIO}
LYMPHOCYTES # BLD AUTO: 4.3 10*3/MM3 (ref 0.7–3.1)
LYMPHOCYTES NFR BLD AUTO: 40.8 % (ref 19.6–45.3)
MCH RBC QN AUTO: 31.3 PG (ref 26.6–33)
MCHC RBC AUTO-ENTMCNC: 35.2 G/DL (ref 31.5–35.7)
MCV RBC AUTO: 88.9 FL (ref 79–97)
MONOCYTES # BLD AUTO: 0.78 10*3/MM3 (ref 0.1–0.9)
MONOCYTES NFR BLD AUTO: 7.4 % (ref 5–12)
NEUTROPHILS NFR BLD AUTO: 4.94 10*3/MM3 (ref 1.7–7)
NEUTROPHILS NFR BLD AUTO: 46.9 % (ref 42.7–76)
NRBC BLD AUTO-RTO: 0 /100 WBC (ref 0–0.2)
PLATELET # BLD AUTO: 300 10*3/MM3 (ref 140–450)
PMV BLD AUTO: 11.5 FL (ref 6–12)
POTASSIUM SERPL-SCNC: 4.8 MMOL/L (ref 3.5–5.2)
PROT SERPL-MCNC: 7 G/DL (ref 6–8.5)
RBC # BLD AUTO: 5.31 10*6/MM3 (ref 4.14–5.8)
SODIUM SERPL-SCNC: 136 MMOL/L (ref 136–145)
TRIGL SERPL-MCNC: 326 MG/DL (ref 0–150)
TSH SERPL DL<=0.05 MIU/L-ACNC: 3.26 UIU/ML (ref 0.27–4.2)
VLDLC SERPL-MCNC: 55 MG/DL (ref 5–40)
WBC NRBC COR # BLD: 10.54 10*3/MM3 (ref 3.4–10.8)

## 2023-04-12 PROCEDURE — 80050 GENERAL HEALTH PANEL: CPT

## 2023-04-12 PROCEDURE — 36415 COLL VENOUS BLD VENIPUNCTURE: CPT

## 2023-04-12 PROCEDURE — 80061 LIPID PANEL: CPT

## 2024-01-01 NOTE — TELEPHONE ENCOUNTER
Patient called and told him we could give him a months supply. I told him to please make appt and get labs done.    Statement Selected

## 2024-05-12 ENCOUNTER — HOSPITAL ENCOUNTER (EMERGENCY)
Facility: HOSPITAL | Age: 59
Discharge: HOME | End: 2024-05-12
Attending: STUDENT IN AN ORGANIZED HEALTH CARE EDUCATION/TRAINING PROGRAM
Payer: COMMERCIAL

## 2024-05-12 ENCOUNTER — APPOINTMENT (OUTPATIENT)
Dept: CT IMAGING | Facility: HOSPITAL | Age: 59
End: 2024-05-12
Payer: COMMERCIAL

## 2024-05-12 VITALS
TEMPERATURE: 98.8 F | DIASTOLIC BLOOD PRESSURE: 82 MMHG | HEIGHT: 68 IN | WEIGHT: 190 LBS | BODY MASS INDEX: 28.79 KG/M2 | HEART RATE: 90 BPM | SYSTOLIC BLOOD PRESSURE: 164 MMHG | RESPIRATION RATE: 18 BRPM | OXYGEN SATURATION: 99 %

## 2024-05-12 DIAGNOSIS — S01.311A COMPLEX LACERATION OF RIGHT EAR, INITIAL ENCOUNTER: ICD-10-CM

## 2024-05-12 DIAGNOSIS — S70.311A ABRASION OF RIGHT THIGH, INITIAL ENCOUNTER: ICD-10-CM

## 2024-05-12 DIAGNOSIS — S70.11XA CONTUSION OF RIGHT THIGH, INITIAL ENCOUNTER: ICD-10-CM
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
## 2024-05-12 PROCEDURE — 25010000002 LIDOCAINE 1 % SOLUTION

## 2024-05-12 PROCEDURE — 25010000002 TETANUS-DIPHTH-ACELL PERTUSSIS 5-2.5-18.5 LF-MCG/0.5 SUSPENSION PREFILLED SYRINGE

## 2024-05-12 PROCEDURE — 96372 THER/PROPH/DIAG INJ SC/IM: CPT

## 2024-05-12 PROCEDURE — 25010000002 MORPHINE PER 10 MG: Performed by: STUDENT IN AN ORGANIZED HEALTH CARE EDUCATION/TRAINING PROGRAM

## 2024-05-12 PROCEDURE — 70486 CT MAXILLOFACIAL W/O DYE: CPT

## 2024-05-12 PROCEDURE — 70450 CT HEAD/BRAIN W/O DYE: CPT

## 2024-05-12 PROCEDURE — 63710000001 ONDANSETRON ODT 4 MG TABLET DISPERSIBLE: Performed by: STUDENT IN AN ORGANIZED HEALTH CARE EDUCATION/TRAINING PROGRAM

## 2024-05-12 PROCEDURE — 99284 EMERGENCY DEPT VISIT MOD MDM: CPT

## 2024-05-12 PROCEDURE — 72125 CT NECK SPINE W/O DYE: CPT

## 2024-05-12 PROCEDURE — 90715 TDAP VACCINE 7 YRS/> IM: CPT

## 2024-05-12 PROCEDURE — 90471 IMMUNIZATION ADMIN: CPT

## 2024-05-12 RX ORDER — LIDOCAINE HYDROCHLORIDE 10 MG/ML
10 INJECTION, SOLUTION INFILTRATION; PERINEURAL ONCE
Status: COMPLETED | OUTPATIENT
Start: 2024-05-12 | End: 2024-05-12

## 2024-05-12 RX ORDER — CEPHALEXIN 500 MG/1
500 CAPSULE ORAL 4 TIMES DAILY
Qty: 28 CAPSULE | Refills: 0 | Status: SHIPPED | OUTPATIENT
Start: 2024-05-12 | End: 2024-05-19

## 2024-05-12 RX ORDER — ONDANSETRON 4 MG/1
4 TABLET, ORALLY DISINTEGRATING ORAL ONCE
Status: COMPLETED | OUTPATIENT
Start: 2024-05-12 | End: 2024-05-12

## 2024-05-12 RX ORDER — HYDROCODONE BITARTRATE AND ACETAMINOPHEN 5; 325 MG/1; MG/1
1 TABLET ORAL EVERY 6 HOURS PRN
Qty: 10 TABLET | Refills: 0 | Status: SHIPPED | OUTPATIENT
Start: 2024-05-12

## 2024-05-12 RX ORDER — HYDROCODONE BITARTRATE AND ACETAMINOPHEN 5; 325 MG/1; MG/1
1 TABLET ORAL ONCE
Status: COMPLETED | OUTPATIENT
Start: 2024-05-12 | End: 2024-05-12

## 2024-05-12 RX ADMIN — MORPHINE SULFATE 4 MG: 4 INJECTION, SOLUTION INTRAMUSCULAR; INTRAVENOUS at 12:35

## 2024-05-12 RX ADMIN — HYDROCODONE BITARTRATE AND ACETAMINOPHEN 1 TABLET: 5; 325 TABLET ORAL at 11:43

## 2024-05-12 RX ADMIN — LIDOCAINE HYDROCHLORIDE 10 ML: 10 INJECTION, SOLUTION INFILTRATION; PERINEURAL at 11:43

## 2024-05-12 RX ADMIN — TETANUS TOXOID, REDUCED DIPHTHERIA TOXOID AND ACELLULAR PERTUSSIS VACCINE, ADSORBED 0.5 ML: 5; 2.5; 8; 8; 2.5 SUSPENSION INTRAMUSCULAR at 11:44

## 2024-05-12 RX ADMIN — ONDANSETRON 4 MG: 4 TABLET, ORALLY DISINTEGRATING ORAL at 12:34

## 2024-05-12 NOTE — Clinical Note
[facility] EMERGENCY DEPARTMENT  [address] [address]  Phone: [phone]    Troy Ruiz was seen and treated in our emergency department on 5/12/2024.  He may return to work on 05/16/2024.         Thank you for choosing [facility].    Erik Whittington PA-C

## 2024-05-12 NOTE — ED PROVIDER NOTES
Subjective  History of Present Illness:    This is a 58-year-old male, history of diabetes hyperlipidemia hypertension, 1 month out status post cervical spinal fusion, presenting today for evaluation of ear laceration fall.  Patient was fishing when he got a lower hung up, step back into the boat, slipped on a ride, and landed with the right side of his head/ear on the dock.  Patient has a 4 cm laceration across the ear.  There is exposed cartilage.  Bleeding controlled on arrival.  Surgical scar noted on the right lateral neck, no direct cervical spinal tenderness but patient does have some mild right-sided paraspinal cervical tenderness.  Head atraumatic otherwise.  He denies any headaches or vomiting, no loss of consciousness and no anticoagulation.  No back pain.  He is unsure about his immunization status for tetanus      Nurses Notes reviewed and agree, including vitals, allergies, social history and prior medical history.     REVIEW OF SYSTEMS: All systems reviewed and not pertinent unless noted.  Review of Systems   Gastrointestinal:  Negative for nausea and vomiting.   Musculoskeletal:  Positive for neck pain. Negative for back pain.   Skin:  Positive for wound.   Neurological:  Negative for headaches.   All other systems reviewed and are negative.      Past Medical History:   Diagnosis Date    Diabetes mellitus     Hyperlipidemia     Hypertension 2005    Hypothyroidism     Tattoos        Allergies:    Pneumococcal vaccine      Past Surgical History:   Procedure Laterality Date    BELPHAROPTOSIS REPAIR      droopy eyelids    MYRINGOTOMY W/ TUBES      WISDOM TOOTH EXTRACTION           Social History     Socioeconomic History    Marital status:    Tobacco Use    Smoking status: Former     Current packs/day: 0.00     Types: Cigarettes     Start date:      Quit date:      Years since quittin.3    Smokeless tobacco: Current   Vaping Use    Vaping status: Never Used   Substance and Sexual  "Activity    Alcohol use: Yes     Alcohol/week: 8.0 - 12.0 standard drinks of alcohol     Types: 8 - 12 Cans of beer per week     Comment: weekly    Drug use: Never    Sexual activity: Defer         Family History   Problem Relation Age of Onset    Other Mother     Heart disease Mother     Diabetes Mother     Colon cancer Neg Hx     Cirrhosis Neg Hx     Liver cancer Neg Hx     Liver disease Neg Hx        Objective  Physical Exam:  BP (!) 190/104 (BP Location: Left arm, Patient Position: Sitting)   Pulse 98   Temp 98.1 °F (36.7 °C) (Oral)   Resp 18   Ht 172.7 cm (68\")   Wt 86.2 kg (190 lb)   SpO2 98%   BMI 28.89 kg/m²      Physical Exam  Vitals and nursing note reviewed.   Constitutional:       General: He is not in acute distress.     Appearance: Normal appearance. He is normal weight. He is not ill-appearing, toxic-appearing or diaphoretic.   HENT:      Head: Normocephalic.        Comments: Laceration as pictured above, 4 cm     Right Ear: Tympanic membrane and ear canal normal. There is no impacted cerumen.      Left Ear: Tympanic membrane, ear canal and external ear normal. There is no impacted cerumen.      Ears:      Comments: Laceration of the right external area as pictured.     Nose: Nose normal.      Mouth/Throat:      Mouth: Mucous membranes are moist.      Pharynx: Oropharynx is clear.   Eyes:      Extraocular Movements: Extraocular movements intact.      Pupils: Pupils are equal, round, and reactive to light.   Cardiovascular:      Pulses: Normal pulses.      Comments: Appears well-perfused  Pulmonary:      Effort: Pulmonary effort is normal. No respiratory distress.      Breath sounds: Normal breath sounds. No stridor. No wheezing, rhonchi or rales.   Abdominal:      General: Abdomen is flat. There is no distension.      Tenderness: There is no abdominal tenderness. There is no guarding.   Musculoskeletal:         General: Tenderness present. No swelling or deformity. Normal range of motion.      " Cervical back: Normal range of motion and neck supple. Tenderness present.   Skin:     General: Skin is warm and dry.      Capillary Refill: Capillary refill takes less than 2 seconds.      Findings: Bruising present.   Neurological:      General: No focal deficit present.      Mental Status: He is alert and oriented to person, place, and time.   Psychiatric:         Mood and Affect: Mood normal.         Behavior: Behavior normal.         Thought Content: Thought content normal.         Judgment: Judgment normal.         Pre lac repair:        Post Lac Repair:      Laceration Repair    Date/Time: 5/12/2024 12:47 PM    Performed by: Erik Whittington PA-C  Authorized by: Reed Alexandre MD    Consent:     Consent obtained:  Verbal    Consent given by:  Patient    Risks, benefits, and alternatives were discussed: yes      Risks discussed:  Infection, pain, poor cosmetic result and poor wound healing    Alternatives discussed:  No treatment and referral  Universal protocol:     Procedure explained and questions answered to patient or proxy's satisfaction: yes      Patient identity confirmed:  Verbally with patient  Anesthesia:     Anesthesia method:  Nerve block    Block location:  Auricular    Block needle gauge:  27 G    Block anesthetic:  Lidocaine 1% w/o epi    Block injection procedure:  Anatomic landmarks identified, introduced needle, negative aspiration for blood, incremental injection and anatomic landmarks palpated    Block outcome:  Anesthesia achieved  Laceration details:     Location:  Ear    Ear location:  R ear    Length (cm):  4  Pre-procedure details:     Preparation:  Patient was prepped and draped in usual sterile fashion  Exploration:     Imaging obtained comment:  Ct    Imaging outcome: foreign body not noted      Wound exploration: wound explored through full range of motion and entire depth of wound visualized      Wound extent: no areolar tissue violation noted, no fascia violation noted, no  foreign bodies/material noted, no nerve damage noted and no vascular damage noted      Wound extent comment:  Cartilage involvement    Contaminated: no    Treatment:     Area cleansed with:  Chlorhexidine    Amount of cleaning:  Extensive    Irrigation solution:  Sterile water    Irrigation volume:  500cc    Irrigation method:  Pressure wash    Debridement:  None    Undermining:  None    Scar revision: no      Layers repaired: dermal.  Skin repair:     Repair method:  Sutures    Suture size:  5-0    Suture material:  Nylon    Suture technique:  Simple interrupted    Number of sutures:  15  Approximation:     Approximation:  Close  Repair type:     Repair type:  Simple  Post-procedure details:     Dressing:  Open (no dressing)    Procedure completion:  Tolerated well, no immediate complications  Nerve Block    Date/Time: 5/12/2024 1:19 PM    Performed by: Erik Whittington PA-C  Authorized by: Reed Alexandre MD    Consent:     Consent obtained:  Verbal    Consent given by:  Patient    Risks, benefits, and alternatives were discussed: yes      Risks discussed:  Infection, bleeding, intravenous injection, pain, swelling and unsuccessful block    Alternatives discussed:  Alternative treatment  Universal protocol:     Procedure explained and questions answered to patient or proxy's satisfaction: yes      Patient identity confirmed:  Verbally with patient  Indications:     Indications:  Procedural anesthesia  Location:     Body area:  Head    Head nerve:  Auricular    Laterality:  Right  Pre-procedure details:     Skin preparation:  Chlorhexidine    Preparation: Patient was prepped and draped in usual sterile fashion    Skin anesthesia:     Skin anesthesia method:  Local infiltration    Local anesthetic:  Lidocaine 1% w/o epi  Post-procedure details:     Dressing:  None    Outcome:  Anesthesia achieved    Procedure completion:  Tolerated well, no immediate complications      ED Course:         Lab Results (last 24 hours)        ** No results found for the last 24 hours. **             CT Maxillofacial Without Contrast    Result Date: 5/12/2024  PROCEDURE: CT MAXILLOFACIAL WO CONT-  HISTORY: right jaw tenderness after fall onto dock, rule out facial fx; W19.XXXA-Unspecified fall, initial encounter; S01.311A-Laceration without foreign body of right ear, initial encounter; S70.311A-Abrasion, right thigh, initial encounter; S70.11XA-Contusion of right thigh, initial encounter   COMPARISON: None.  TECHNIQUE: Multiple axial CT sections were performed through the facial bones and sinuses without contrast. Coronal reconstruction images were performed. This study was performed with techniques to keep radiation doses as low as reasonably achievable, (ALARA). Individualized dose reduction techniques using automated exposure control or adjustment of mA and/or kV according to the patient size were employed.   FINDINGS:  No fracture is identified . The orbital floor  is intact . No  air-fluid levels are identified .  No significant soft tissue swelling is identified .      Impression: No facial bone fracture .     CTDI: 23.55 mGy DLP:691.03 mGy.cm  This report was signed and finalized on 5/12/2024 12:03 PM by Karlo Dupree MD.      CT Cervical Spine Without Contrast    Result Date: 5/12/2024  PROCEDURE: CT CERVICAL SPINE WO CONTRAST-  HISTORY: fall, hit right side of neck on dock; W19.XXXA-Unspecified fall, initial encounter; S01.311A-Laceration without foreign body of right ear, initial encounter; S70.311A-Abrasion, right thigh, initial encounter; S70.11XA-Contusion of right thigh, initial encounter  FINDINGS: Axial CT images of the cervical spine were obtained. Sagittal and coronal reformatted images were also obtained. This study was performed with techniques to keep radiation doses as low as reasonably achievable, (ALARA). Individualized dose reduction techniques using automated exposure control or adjustment of mA and/or kV according to the  patient size were employed.  There is no evidence of fracture or dislocation. Postoperative changes are seen from fusion from C5-C7.. Multilevel mild and moderate degenerative changes are seen with multilevel osteophytes. Multilevel neural foraminal narrowing is seen, greatest on the left at C5-6.. There is no evidence of significant canal stenosis. Note is made of bilateral carotid artery calcifications..  Limited images of the upper thorax are unremarkable.        Impression: Postoperative changes from fusion C5 through C7.  No fracture or acute bony abnormality.  Mild and moderate degenerative change..       CTDI: 23.55 mGy DLP:691.03 mGy.cm  This report was signed and finalized on 5/12/2024 12:00 PM by Karlo Dupree MD.      CT Head Without Contrast    Result Date: 5/12/2024  PROCEDURE: CT HEAD WO CONTRAST-  HISTORY: fall hit right side of neck on dock, dizzy after; W19.XXXA-Unspecified fall, initial encounter; S01.311A-Laceration without foreign body of right ear, initial encounter; S70.311A-Abrasion, right thigh, initial encounter; S70.11XA-Contusion of right thigh, initial encounter  COMPARISON: None  TECHNIQUE: Multiple axial CT images were performed from the foramen magnum to the vertex without contrast. Coronal reformatted images were also obtained.This study was performed with techniques to keep radiation doses as low as reasonably achievable, (ALARA). Individualized dose reduction techniques using automated exposure control or adjustment of mA and/or kV according to the patient size were employed.   FINDINGS: The ventricles are normal in size. There is no evidence of hemorrhage .  No masses are identified.  No abnormal extra-axial fluid collection is seen.  There is no evidence of shift of the midline structures. Images of the sinuses reveal no evidence of mucosal thickening. No bony abnormality is seen on the bone window images.      Impression: No acute intracranial abnormality.      CTDI: 23.55 mGy  DLP:691.03 mGy.cm   This report was signed and finalized on 5/12/2024 11:58 AM by Karlo Dupree MD.          MDM      Initial impression of presenting illness: Is a 58-year-old male present today for evaluation mechanical fall ear laceration neck pain    DDX: includes but is not limited to: Laceration abrasion contusion puncture, osseous abnormality, subluxation, dislocation, contusion, hematoma, others    Patient arrives hemodynamically stable, hypertensive with 190/104, afebrile, nontachycardic nonhypoxic and nontoxic-appearing.  History of documented hypertension with vitals interpreted by myself.     Pertinent features from physical exam: Patient has a 4 cm laceration in the external right ear as pictured above.  Bilateral tympanic membranes are unremarkable, no hemotympanum.  Canals are normal.  Head otherwise atraumatic.  Minimal right-sided cervical paraspinal tenderness, no direct cervical thoracic or lumbar spine tenderness.  No step-off or deformities palpated.  Patient does have a contusion to the right outer thigh, no tenderness of the hip.  Right-sided jaw tenderness to palpation, no jaw malocclusion.  There is no evidence of auricular hematoma.    Initial diagnostic plan: CT head and cervical spine without contrast.  CT maxillofacial without contrast patient declined plain film imaging of the right femur.    Results from initial plan were reviewed and interpreted by me revealing CT imaging negative per radiology interpretation.    Diagnostic information from other sources: Record reviewed    Interventions / Re-evaluation: Discussed risk benefits of closure including scarring.  Norco, Boostrix, lidocaine for auricular block.  Morphine IM for further pain control.  Patient consents for laceration repair, irrigated extensively, auricular block performed with lidocaine 1% without epinephrine, prior to this, area cleansed with chlorhexidine.  See procedure note for further details.    Results/clinical  rationale were discussed with patient at bedside.  Discussed findings of CT including postoperative change as well as carotid artery calcifications recommend close follow-up with his primary care physician for consideration of carotid Dopplers.  Patient seems asymptomatic in regards to carotid calcifications at this time.  All CT imaging was negative for acute process.  Tolerated lack repair without difficulty.  Will place on prophylactic Keflex, irrigated extensively prior to closure and updated tetanus status.  Recommend follow-up with ENT primary care.  Recommended 10 to 12 days for suture removal.  Patient was agreeable to this plan at bedside and tolerated procedure above without difficulty.    Consultations/Discussion of results with other physicians: N/A    Disposition plan: Discharge.  Sent Norco as needed for pain, Keflex for prophylaxis of infection.  Recommend follow-up with ENT and primary care.  10 to 12 days for suture removal.  Educated on signs symptoms of wound infection.  Return precautions given.  Family was agreeable plan at bedside.  -----    Final diagnoses:   Fall, initial encounter   Complex laceration of right ear, initial encounter   Abrasion of right thigh, initial encounter   Contusion of right thigh, initial encounter          Erik Whittington PA-C  05/12/24 [id number]

## 2024-05-12 NOTE — DISCHARGE INSTRUCTIONS
Follow-up with primary care physician.  Recommend 10 to 12 days for suture removal.  Watch for signs and symptoms infection as we discussed.  I sent pain meds to your pharmacy as needed for pain.  I have additionally attached number to follow-up with ENT which is ear nose and throat physician.

## 2025-01-27 ENCOUNTER — TRANSCRIBE ORDERS (OUTPATIENT)
Dept: LAB | Facility: HOSPITAL | Age: 60
End: 2025-01-27
Payer: COMMERCIAL

## 2025-01-27 DIAGNOSIS — Z01.812 PRE-OPERATIVE LABORATORY EXAMINATION: Primary | ICD-10-CM
